# Patient Record
Sex: FEMALE | Race: WHITE | NOT HISPANIC OR LATINO | Employment: FULL TIME | URBAN - METROPOLITAN AREA
[De-identification: names, ages, dates, MRNs, and addresses within clinical notes are randomized per-mention and may not be internally consistent; named-entity substitution may affect disease eponyms.]

---

## 2018-12-28 ENCOUNTER — OFFICE VISIT (OUTPATIENT)
Dept: URGENT CARE | Facility: CLINIC | Age: 53
End: 2018-12-28
Payer: COMMERCIAL

## 2018-12-28 VITALS
RESPIRATION RATE: 16 BRPM | TEMPERATURE: 97.7 F | DIASTOLIC BLOOD PRESSURE: 86 MMHG | HEIGHT: 67 IN | BODY MASS INDEX: 32.33 KG/M2 | SYSTOLIC BLOOD PRESSURE: 142 MMHG | HEART RATE: 72 BPM | WEIGHT: 206 LBS | OXYGEN SATURATION: 99 %

## 2018-12-28 DIAGNOSIS — L72.9 INFECTED CYST OF SKIN: Primary | ICD-10-CM

## 2018-12-28 DIAGNOSIS — L08.9 INFECTED CYST OF SKIN: Primary | ICD-10-CM

## 2018-12-28 PROCEDURE — 99213 OFFICE O/P EST LOW 20 MIN: CPT | Performed by: PHYSICIAN ASSISTANT

## 2018-12-28 RX ORDER — CLINDAMYCIN HYDROCHLORIDE 300 MG/1
300 CAPSULE ORAL 3 TIMES DAILY
Qty: 21 CAPSULE | Refills: 0 | Status: SHIPPED | OUTPATIENT
Start: 2018-12-28 | End: 2019-01-04

## 2018-12-28 RX ORDER — LOSARTAN POTASSIUM 50 MG/1
25 TABLET ORAL DAILY
COMMUNITY

## 2018-12-29 NOTE — PATIENT INSTRUCTIONS
Take clindamycin 3 times daily for 7 days  Take with food and eat yogurt or a probiotic daily to decrease GI upset  Warm compresses frequently  Follow up with dermatology or surgery for removal   Go to the ER for any distress

## 2018-12-31 NOTE — PROGRESS NOTES
[facility]        NAME: Car Romero is a 46 y o  female  : 1965    MRN: [id number]  DATE: 2018  TIME: 8:16 AM    Assessment and Plan   Infected cyst of skin [L72 9, L08 9]  1  Infected cyst of skin  clindamycin (CLEOCIN) 300 MG capsule         Patient Instructions     Take clindamycin 3 times daily for 7 days  Take with food and eat yogurt or a probiotic daily to decrease GI upset  Warm compresses frequently  Follow up with dermatology or surgery for removal   Follow up with PCP in 3-5 days  Proceed to  ER if symptoms worsen  Chief Complaint     Chief Complaint   Patient presents with    Mass     on upper left side of back 20 years, 3 days ago red and swollen         History of Present Illness       This is a 46year old female presenting for cyst x 20 years  She reports that she has had the cyst on her left posterior shoulder for at least 20 years, it has never bothered her before  She was previously advised to follow up with dermatology for removal but did not  Yesterday she noticed that it was slightly painful to touch, which has gradually increased the last day  No fevers, chills, or drainage from the cyst  No treatments for this yet  Review of Systems   Review of Systems   Constitutional: Negative for chills, fatigue and fever  Skin: Positive for color change           Current Medications       Current Outpatient Prescriptions:     losartan (COZAAR) 50 mg tablet, Take 25 mg by mouth daily, Disp: , Rfl:     clindamycin (CLEOCIN) 300 MG capsule, Take 1 capsule (300 mg total) by mouth 3 (three) times a day for 7 days, Disp: 21 capsule, Rfl: 0    Current Allergies     Allergies as of 2018    (No Known Allergies)            The following portions of the patient's history were reviewed and updated as appropriate: allergies, current medications, past family history, past medical history, past social history, past surgical history and problem list      No past medical history on file  No past surgical history on file  No family history on file  Medications have been verified  Objective   /86   Pulse 72   Temp 97 7 °F (36 5 °C)   Resp 16   Ht 5' 7" (1 702 m)   Wt 93 4 kg (206 lb)   SpO2 99%   BMI 32 26 kg/m²        Physical Exam     Physical Exam   Constitutional: She appears well-developed and well-nourished  No distress  HENT:   Nose: Nose normal    Cardiovascular: Normal rate, regular rhythm and normal heart sounds  Pulmonary/Chest: Effort normal and breath sounds normal  No respiratory distress  Neurological: She is alert  Skin: Skin is warm and dry  She is not diaphoretic  There is a 2 cm in diameter area of edema to the posterior left shoulder  There is overlying erythema of the area, some mild fluctuation with underlying induration  The area is tender to the touch  There is no drainage, streaking, or crusting  Nursing note and vitals reviewed

## 2020-10-09 ENCOUNTER — HOSPITAL ENCOUNTER (EMERGENCY)
Facility: HOSPITAL | Age: 55
Discharge: HOME | End: 2020-10-09
Attending: EMERGENCY MEDICINE | Admitting: EMERGENCY MEDICINE
Payer: COMMERCIAL

## 2020-10-09 ENCOUNTER — APPOINTMENT (EMERGENCY)
Dept: RADIOLOGY | Facility: HOSPITAL | Age: 55
End: 2020-10-09
Payer: COMMERCIAL

## 2020-10-09 VITALS
HEART RATE: 75 BPM | RESPIRATION RATE: 18 BRPM | SYSTOLIC BLOOD PRESSURE: 156 MMHG | DIASTOLIC BLOOD PRESSURE: 84 MMHG | BODY MASS INDEX: 32.11 KG/M2 | WEIGHT: 205 LBS | TEMPERATURE: 97.6 F | OXYGEN SATURATION: 100 %

## 2020-10-09 DIAGNOSIS — M54.10 RADICULOPATHY: ICD-10-CM

## 2020-10-09 DIAGNOSIS — R20.2 PARESTHESIAS: Primary | ICD-10-CM

## 2020-10-09 LAB
ALBUMIN SERPL BCP-MCNC: 4.6 G/DL (ref 3.5–5)
ALP SERPL-CCNC: 97 U/L (ref 46–116)
ALT SERPL W P-5'-P-CCNC: 30 U/L (ref 12–78)
ANION GAP SERPL CALCULATED.3IONS-SCNC: 9 MMOL/L (ref 4–13)
AST SERPL W P-5'-P-CCNC: 21 U/L (ref 5–45)
ATRIAL RATE: 68 BPM
ATRIAL RATE: 76 BPM
BASOPHILS # BLD AUTO: 0.08 THOUSANDS/ΜL (ref 0–0.1)
BASOPHILS NFR BLD AUTO: 1 % (ref 0–1)
BILIRUB SERPL-MCNC: 0.4 MG/DL (ref 0.2–1)
BUN SERPL-MCNC: 22 MG/DL (ref 5–25)
CALCIUM SERPL-MCNC: 9.5 MG/DL (ref 8.3–10.1)
CHLORIDE SERPL-SCNC: 103 MMOL/L (ref 100–108)
CO2 SERPL-SCNC: 29 MMOL/L (ref 21–32)
CREAT SERPL-MCNC: 0.87 MG/DL (ref 0.6–1.3)
EOSINOPHIL # BLD AUTO: 0.32 THOUSAND/ΜL (ref 0–0.61)
EOSINOPHIL NFR BLD AUTO: 4 % (ref 0–6)
ERYTHROCYTE [DISTWIDTH] IN BLOOD BY AUTOMATED COUNT: 13 % (ref 11.6–15.1)
GFR SERPL CREATININE-BSD FRML MDRD: 75 ML/MIN/1.73SQ M
GLUCOSE SERPL-MCNC: 121 MG/DL (ref 65–140)
HCT VFR BLD AUTO: 43 % (ref 34.8–46.1)
HGB BLD-MCNC: 14.3 G/DL (ref 11.5–15.4)
IMM GRANULOCYTES # BLD AUTO: 0.03 THOUSAND/UL (ref 0–0.2)
IMM GRANULOCYTES NFR BLD AUTO: 0 % (ref 0–2)
LYMPHOCYTES # BLD AUTO: 1.92 THOUSANDS/ΜL (ref 0.6–4.47)
LYMPHOCYTES NFR BLD AUTO: 24 % (ref 14–44)
MCH RBC QN AUTO: 31.9 PG (ref 26.8–34.3)
MCHC RBC AUTO-ENTMCNC: 33.3 G/DL (ref 31.4–37.4)
MCV RBC AUTO: 96 FL (ref 82–98)
MONOCYTES # BLD AUTO: 0.57 THOUSAND/ΜL (ref 0.17–1.22)
MONOCYTES NFR BLD AUTO: 7 % (ref 4–12)
NEUTROPHILS # BLD AUTO: 5.1 THOUSANDS/ΜL (ref 1.85–7.62)
NEUTS SEG NFR BLD AUTO: 64 % (ref 43–75)
NRBC BLD AUTO-RTO: 0 /100 WBCS
P AXIS: 53 DEGREES
P AXIS: 53 DEGREES
PLATELET # BLD AUTO: 243 THOUSANDS/UL (ref 149–390)
PMV BLD AUTO: 11.1 FL (ref 8.9–12.7)
POTASSIUM SERPL-SCNC: 4.1 MMOL/L (ref 3.5–5.3)
PR INTERVAL: 146 MS
PR INTERVAL: 150 MS
PROT SERPL-MCNC: 8.1 G/DL (ref 6.4–8.2)
QRS AXIS: 24 DEGREES
QRS AXIS: 34 DEGREES
QRSD INTERVAL: 82 MS
QRSD INTERVAL: 84 MS
QT INTERVAL: 376 MS
QT INTERVAL: 410 MS
QTC INTERVAL: 423 MS
QTC INTERVAL: 435 MS
RBC # BLD AUTO: 4.48 MILLION/UL (ref 3.81–5.12)
SODIUM SERPL-SCNC: 141 MMOL/L (ref 136–145)
T WAVE AXIS: 52 DEGREES
T WAVE AXIS: 57 DEGREES
TROPONIN I SERPL-MCNC: <0.02 NG/ML
VENTRICULAR RATE: 68 BPM
VENTRICULAR RATE: 76 BPM
WBC # BLD AUTO: 8.02 THOUSAND/UL (ref 4.31–10.16)

## 2020-10-09 PROCEDURE — 80053 COMPREHEN METABOLIC PANEL: CPT | Performed by: EMERGENCY MEDICINE

## 2020-10-09 PROCEDURE — 85025 COMPLETE CBC W/AUTO DIFF WBC: CPT | Performed by: EMERGENCY MEDICINE

## 2020-10-09 PROCEDURE — 84484 ASSAY OF TROPONIN QUANT: CPT | Performed by: EMERGENCY MEDICINE

## 2020-10-09 PROCEDURE — 71045 X-RAY EXAM CHEST 1 VIEW: CPT

## 2020-10-09 PROCEDURE — 93010 ELECTROCARDIOGRAM REPORT: CPT | Performed by: INTERNAL MEDICINE

## 2020-10-09 PROCEDURE — 99285 EMERGENCY DEPT VISIT HI MDM: CPT | Performed by: EMERGENCY MEDICINE

## 2020-10-09 PROCEDURE — 36415 COLL VENOUS BLD VENIPUNCTURE: CPT | Performed by: EMERGENCY MEDICINE

## 2020-10-09 PROCEDURE — 99284 EMERGENCY DEPT VISIT MOD MDM: CPT

## 2020-10-09 PROCEDURE — 93005 ELECTROCARDIOGRAM TRACING: CPT

## 2020-12-09 ENCOUNTER — NURSE TRIAGE (OUTPATIENT)
Dept: OTHER | Facility: OTHER | Age: 55
End: 2020-12-09

## 2020-12-09 DIAGNOSIS — Z20.822 ENCOUNTER FOR SCREENING LABORATORY TESTING FOR COVID-19 VIRUS IN ASYMPTOMATIC PATIENT: Primary | ICD-10-CM

## 2020-12-10 DIAGNOSIS — Z20.822 ENCOUNTER FOR SCREENING LABORATORY TESTING FOR COVID-19 VIRUS IN ASYMPTOMATIC PATIENT: ICD-10-CM

## 2020-12-10 PROCEDURE — U0003 INFECTIOUS AGENT DETECTION BY NUCLEIC ACID (DNA OR RNA); SEVERE ACUTE RESPIRATORY SYNDROME CORONAVIRUS 2 (SARS-COV-2) (CORONAVIRUS DISEASE [COVID-19]), AMPLIFIED PROBE TECHNIQUE, MAKING USE OF HIGH THROUGHPUT TECHNOLOGIES AS DESCRIBED BY CMS-2020-01-R: HCPCS | Performed by: FAMILY MEDICINE

## 2020-12-11 LAB — SARS-COV-2 RNA SPEC QL NAA+PROBE: NOT DETECTED

## 2021-03-10 DIAGNOSIS — Z23 ENCOUNTER FOR IMMUNIZATION: ICD-10-CM

## 2022-11-14 ENCOUNTER — EVALUATION (OUTPATIENT)
Dept: PHYSICAL THERAPY | Facility: CLINIC | Age: 57
End: 2022-11-14

## 2022-11-14 DIAGNOSIS — M25.552 LEFT HIP PAIN: Primary | ICD-10-CM

## 2022-11-14 NOTE — PROGRESS NOTES
PT Evaluation     Today's date: 2022  Patient name: Cece De  : 1965  MRN: [id number]  Referring provider: Sean Terry MD  Dx: No diagnosis found  Assessment  Assessment details: Cece De is a 61 y o  female presenting to outpatient physical therapy at [place] [address] with complaints of L hip pain   They present with decreased range of motion, decreased strength, limited flexibility, poor postural awareness, poor body mechanics, poor balance, decreased tolerance to activity and decreased functional mobility  Yrn Peers would benefit from skilled physical therapy to address noted impairments in order to allow for full functional return to work and ADL-related activities  Thank you for the referral!  Impairments: abnormal muscle firing, abnormal or restricted ROM, activity intolerance, impaired balance, impaired physical strength, lacks appropriate home exercise program, pain with function and poor body mechanics  Barriers to therapy: n/a  Understanding of Dx/Px/POC: excellent  Goals  ST   Independent with HEP in 2 weeks  2  Decrease pain by 50% in 3 wks    LT  Achieve FOTO score of 67/100 in 6 weeks   2   Able to hold SLS 30 sec bilaterally, no sway in 6 weeks  3  Strength = 5/5 hip ext bilat in 6 weeks  4  Able to hold a side plank 30 sec bilaterally in 6 weeks      Plan  Plan details: RE in 6 weeks    Patient would benefit from: skilled physical therapy  Planned modality interventions: cryotherapy, electrical stimulation/Russian stimulation and thermotherapy: hydrocollator packs  Planned therapy interventions: abdominal trunk stabilization, manual therapy, neuromuscular re-education, therapeutic activities, therapeutic exercise, body mechanics training and home exercise program  Frequency: 2x week  Duration in visits: 12  Duration in weeks: 6  Plan of Care beginning date: 2022  Plan of Care expiration date: 2022  Treatment plan discussed with: patient        Subjective Evaluation    History of Present Illness  Mechanism of injury: Pt c/o L hip pain  She does powerwalking 6 nights per week  She would occasionally wake with L hip pain the following morning  This would resolve with time and movement throughout the day  2 weeks ago, she again woke with the L hip pain, but it progressively got worse to the point of her being unable to walk standing straight up, needing to hunch over  She has tried a cortisone injection without relief, stretching with temporary relief  She has been taking pain medicine daily  She now feels the pain is going into her low back because she is walking differently  Pain is located L greater troch, radiates posterior-inferiorly  Lateral portion of the LLE feels tight  Rated 0/10 at best, 10/10 at worst   Agg with walking 10 mins, with lifting her LLE into her jeep  Denies falls, weakness, recent fever or infection, bowel/bladder changes  Occasional feelings of instability  Positive for lateral calf numbness  Walking classes are between 15-40 mins  She also walks the dog 3 times per day  She works as a  in the [employer]  No pain with working at her desk  She tries to get up and walk frequently  Patient Goals  Patient goals for therapy: decreased edema, decreased pain, improved balance, increased motion, return to work, return to [employer] activities, independence with ADLs/IADLs and increased strength          Objective     Observations     Additional Observation Details  Hips symm standing and supine; NL spinal curves maintained    Tenderness     Additional Tenderness Details  TTP gmax, gmed    (-) direct pref  (-) JAYDA JAMES  (-) hip scour  (-) hip thrust  (-) S/L francesca  (+) elys bilat    SLR 90+ deg bilat; HS length adequate    Lumbar Screen  Lumbar range of motion within normal limits      Neurological Testing     Sensation     Hip   Left Hip   Intact: light touch    Right Hip   Intact: light touch    Reflexes   Left   Clonus sign: negative    Right   Clonus sign: negative    Active Range of Motion   Left Hip   Normal active range of motion    Right Hip   Extension: 0 degrees     Strength/Myotome Testing     Left Hip   Planes of Motion   Flexion: 5  Extension: 4+  Abduction: 5  Adduction: 5  External rotation: 5  Internal rotation: 5    Isolated Muscles   Gluteus mihai: 4+  TFL: 5  Iliopsoas: 5    Right Hip   Planes of Motion   Flexion: 5  Extension: 4+  Abduction: 5  Adduction: 5  External rotation: 5  Internal rotation: 5    Isolated Muscles   Gluteus maximums: 4+  TFL: 5  Iliopsoas: 5    Functional Assessment        Comments  Gait: antalgic  HR: NL  TR: off balance  Squat: fair  SLS: 30 sec bilaterally with incr sway L>R             Precautions: L hip pain, core weakness  Other factors: current smoker  Pt goals: return to power walking classes (30 mins long)      HEP:  Access Code: [email or web address]  URL: [email or web address]  Date: 11/14/2022  Prepared by: Cinthya Harris    Exercises  · Glute squeeze + bridge - 10 reps - 10 sec hold  · Side Plank on Knees - 5 reps - 10 sec hold  · Bird Dog - 5 reps - 10 sec hold            Manuals 11/14            R hip AP mob                                                    Neuro Re-Ed             SLS             tandem             Foam walk f-b, lat             bosu balance             bosu taps             Cone tap                          Ther Ex             bike             Bridge holds 10"x5            Mini SLR             90/90 heel taps             Side plank 10"x5 ea            Bird dog 10"x5 ea            Suitcase carry             Front plank             Prone quad stretch                                                    Ther Activity                                       Gait Training                                       Modalities

## 2022-11-14 NOTE — LETTER
November 15, 2022    [facility]  [address]  [address] [place] [address]    Patient: Hermes Mccormack   YOB: 1965   Date of Visit: 2022     Encounter Diagnosis     ICD-10-CM    1  Left hip pain  M25 552        Dear Dr Chucho Burton: Thank you for your recent referral of Hermes Mccormack  Please review the attached evaluation summary from Melissa's recent visit  Please verify that you agree with the plan of care by signing the attached order  If you have any questions or concerns, please do not hesitate to call  I sincerely appreciate the opportunity to share in the care of one of your patients and hope to have another opportunity to work with you in the near future  Sincerely,    Yossi Perez, PT      Referring Provider:      I certify that I have read the below Plan of Care and certify the need for these services furnished under this plan of treatment while under my care  Pola King MD  Ол[address] [facility] [address]  [address]  [address] [place], [place] [id number]  Via Fax: [phone]          PT Evaluation     Today's date: 2022  Patient name: Hermes Mccormack  : 1965  MRN: [id number]  Referring provider: Marcio Malone MD  Dx: No diagnosis found  Assessment  Assessment details: Hermes Mccormack is a 61 y o  female presenting to outpatient physical therapy at [place] [address] with complaints of L hip pain   They present with decreased range of motion, decreased strength, limited flexibility, poor postural awareness, poor body mechanics, poor balance, decreased tolerance to activity and decreased functional mobility  Talita Figueroa would benefit from skilled physical therapy to address noted impairments in order to allow for full functional return to work and ADL-related activities  Thank you for the referral!  Impairments: abnormal muscle firing, abnormal or restricted ROM, activity intolerance, impaired balance, impaired physical strength, lacks appropriate home exercise program, pain with function and poor body mechanics  Barriers to therapy: n/a  Understanding of Dx/Px/POC: excellent  Goals  ST   Independent with HEP in 2 weeks  2  Decrease pain by 50% in 3 wks    LT  Achieve FOTO score of 67/100 in 6 weeks   2   Able to hold SLS 30 sec bilaterally, no sway in 6 weeks  3  Strength = 5/5 hip ext bilat in 6 weeks  4  Able to hold a side plank 30 sec bilaterally in 6 weeks      Plan  Plan details: RE in 6 weeks  Patient would benefit from: skilled physical therapy  Planned modality interventions: cryotherapy, electrical stimulation/Russian stimulation and thermotherapy: hydrocollator packs  Planned therapy interventions: abdominal trunk stabilization, manual therapy, neuromuscular re-education, therapeutic activities, therapeutic exercise, body mechanics training and home exercise program  Frequency: 2x week  Duration in visits: 12  Duration in weeks: 6  Plan of Care beginning date: 2022  Plan of Care expiration date: 2022  Treatment plan discussed with: patient        Subjective Evaluation    History of Present Illness  Mechanism of injury: Pt c/o L hip pain  She does powerwalking 6 nights per week  She would occasionally wake with L hip pain the following morning  This would resolve with time and movement throughout the day  2 weeks ago, she again woke with the L hip pain, but it progressively got worse to the point of her being unable to walk standing straight up, needing to hunch over  She has tried a cortisone injection without relief, stretching with temporary relief  She has been taking pain medicine daily  She now feels the pain is going into her low back because she is walking differently  Pain is located L greater troch, radiates posterior-inferiorly  Lateral portion of the LLE feels tight  Rated 0/10 at best, 10/10 at worst   Agg with walking 10 mins, with lifting her LLE into her jeep   Denies falls, weakness, recent fever or infection, bowel/bladder changes  Occasional feelings of instability  Positive for lateral calf numbness  Walking classes are between 15-40 mins  She also walks the dog 3 times per day  She works as a  in the [employer]  No pain with working at her desk  She tries to get up and walk frequently  Patient Goals  Patient goals for therapy: decreased edema, decreased pain, improved balance, increased motion, return to work, return to [employer] activities, independence with ADLs/IADLs and increased strength          Objective     Observations     Additional Observation Details  Hips symm standing and supine; NL spinal curves maintained    Tenderness     Additional Tenderness Details  TTP gmax, gmed    (-) direct pref  (-) JACOBMARKIR  (-) hip scour  (-) hip thrust  (-) S/L francesca  (+) elys bilat    SLR 90+ deg bilat; HS length adequate    Lumbar Screen  Lumbar range of motion within normal limits      Neurological Testing     Sensation     Hip   Left Hip   Intact: light touch    Right Hip   Intact: light touch    Reflexes   Left   Clonus sign: negative    Right   Clonus sign: negative    Active Range of Motion   Left Hip   Normal active range of motion    Right Hip   Extension: 0 degrees     Strength/Myotome Testing     Left Hip   Planes of Motion   Flexion: 5  Extension: 4+  Abduction: 5  Adduction: 5  External rotation: 5  Internal rotation: 5    Isolated Muscles   Gluteus mihai: 4+  TFL: 5  Iliopsoas: 5    Right Hip   Planes of Motion   Flexion: 5  Extension: 4+  Abduction: 5  Adduction: 5  External rotation: 5  Internal rotation: 5    Isolated Muscles   Gluteus maximums: 4+  TFL: 5  Iliopsoas: 5    Functional Assessment        Comments  Gait: antalgic  HR: NL  TR: off balance  Squat: fair  SLS: 30 sec bilaterally with incr sway L>R             Precautions: L hip pain, core weakness  Other factors: current smoker  Pt goals: return to power walking classes (30 mins long)      HEP:  Access Code: [email or web address]  URL: [email or web address]  Date: 11/14/2022  Prepared by: Lisbet Omalley    Exercises  · Glute squeeze + bridge - 10 reps - 10 sec hold  · Side Plank on Knees - 5 reps - 10 sec hold  · Bird Dog - 5 reps - 10 sec hold            Manuals 11/14            R hip AP mob                                                    Neuro Re-Ed             SLS             tandem             Foam walk f-b, lat             bosu balance             bosu taps             Cone tap                          Ther Ex             bike             Bridge holds 10"x5            Mini SLR             90/90 heel taps             Side plank 10"x5 ea            Bird dog 10"x5 ea            Suitcase carry             Front plank             Prone quad stretch                                                    Ther Activity                                       Gait Training                                       Modalities

## 2022-11-17 ENCOUNTER — OFFICE VISIT (OUTPATIENT)
Dept: PHYSICAL THERAPY | Facility: CLINIC | Age: 57
End: 2022-11-17

## 2022-11-17 DIAGNOSIS — M25.552 LEFT HIP PAIN: Primary | ICD-10-CM

## 2022-11-17 NOTE — PROGRESS NOTES
Daily Note     Today's date: 2022  Patient name: Lonita Boas  : 1965  MRN: [id number]  Referring provider: Lela Trejo MD  Dx:   Encounter Diagnosis     ICD-10-CM    1  Left hip pain  M25 552                      Subjective: Pt continues to be in a lot of pain  Objective: See treatment diary below      Assessment: Initiated PT POC today  Pt presented with significant gait disturbance, favoring her L hip and low back  Noted LLD and SI MET was performed which corrected LLD  STM to low back, QL and hip in collaboration with SI correction her gait improved significantly and pain was minimized  Tolerated treatment well  Patient demonstrated fatigue post treatment, exhibited good technique with therapeutic exercises and would benefit from continued PT      Plan: Continue per plan of care  Progress treatment as tolerated  Precautions: L hip pain, core weakness  Other factors: current smoker  Pt goals: return to power walking classes (30 mins long)      HEP:  Access Code: [email or web address]  URL: [email or web address]  Date: 2022  Prepared by: Tyrone Standard    Exercises  · Glute squeeze + bridge - 10 reps - 10 sec hold  · Side Plank on Knees - 5 reps - 10 sec hold  · Bird Dog - 5 reps - 10 sec hold            Manuals            L hip AP mob             STM L low back, QL, hip  WE           SI MET for LLD  WE corrected                        Neuro Re-Ed             SLS  30"x2           tandem  30"x2 ea           Foam walk f-b, lat             bosu balance             bosu taps             Cone tap                          Ther Ex             bike  5 min           Bridge holds 10"x5 10"x10           Mini SLR  x10           90/90 heel taps             Side plank 10"x5 ea            Bird dog 10"x5 ea            Suitcase carry             Front plank             Prone quad stretch             DLS Abdominal bracing  5"x10           DLS marching  Q63 Ther Activity                                       Gait Training                                       Modalities

## 2022-11-22 ENCOUNTER — OFFICE VISIT (OUTPATIENT)
Dept: PHYSICAL THERAPY | Facility: CLINIC | Age: 57
End: 2022-11-22

## 2022-11-22 DIAGNOSIS — M25.552 LEFT HIP PAIN: Primary | ICD-10-CM

## 2022-11-22 NOTE — PROGRESS NOTES
Daily Note     Today's date: 2022  Patient name: Moon Warner  : 1965  MRN: [id number]  Referring provider: Joanna Jackson MD  Dx:   Encounter Diagnosis     ICD-10-CM    1  Left hip pain  M25 552                      Subjective: Pt reports she felt great after leaving  and carried over into the next days, but by the end of the next day she was hurting again      Objective: See treatment diary below      Assessment: Pt presents again with a slumped, side bent type of position to the R to offload L hip  Pt had no LLD today and SI correction was not needed  She continues to have tenderness in L glute, lateral hip and QL  Eased with manual stretching and STM/TrP release  Post manual therapy she walked with a perfectly upright posture with no pain  She was able to perform increased TE's with excellent tolerance  Tolerated treatment well  Patient demonstrated fatigue post treatment, exhibited good technique with therapeutic exercises and would benefit from continued PT      Plan: Continue per plan of care  Progress treatment as tolerated  Precautions: L hip pain, core weakness  Other factors: current smoker  Pt goals: return to power walking classes (30 mins long)      HEP:  Access Code: [email or web address]  URL: [email or web address]  Date: 2022  Prepared by: Gabrielle Raymond    Exercises  · Glute squeeze + bridge - 10 reps - 10 sec hold  · Side Plank on Knees - 5 reps - 10 sec hold  · Bird Dog - 5 reps - 10 sec hold            Manuals           L hip AP mob             STM L low back, QL, hip  WE WE          SI MET for LLD  WE corrected npt                       Neuro Re-Ed             SLS  30"x2           tandem  30"x2 ea           Foam walk f-b, lat             bosu balance             bosu taps             Cone tap                          Ther Ex             bike  5 min 6 min          Bridge holds 10"x5 10"x10 10"x10          Seated HS stretch   20"x3 ea Seated Lumbar Flx stretch   20"x3 ea          Seated QL stretch   20"x3 ea          Mini SLR  x10 x10          90/90 heel taps             Side plank 10"x5 ea  nv          Bird dog 10"x5 ea            Suitcase carry   nv          Front plank             Prone quad stretch             DLS Abdominal bracing  5"x10           DLS marching  G67 R44 ea          Lateral step ups   8"x20 ea          TB side stepping    BTB  x2 laps          TB monster walks   BTB  x2 laps          Ther Activity                                       Gait Training                                       Modalities

## 2022-11-25 ENCOUNTER — OFFICE VISIT (OUTPATIENT)
Dept: PHYSICAL THERAPY | Facility: CLINIC | Age: 57
End: 2022-11-25

## 2022-11-25 DIAGNOSIS — M25.552 LEFT HIP PAIN: Primary | ICD-10-CM

## 2022-11-25 NOTE — PROGRESS NOTES
Daily Note     Today's date: 2022  Patient name: Supa Reddy  : 1965  MRN: [id number]  Referring provider: Hanny Davidson MD  Dx:   Encounter Diagnosis     ICD-10-CM    1  Left hip pain  M25 552           Start Time: 730          Subjective: Walking better but low back is bothering her after being on her feet for a while yesterday  Objective: See treatment diary below      Assessment: Tolerated independent stretching and TE fine  No change in low back stiffness with introduced low back stretching  Tolerated treatment well  Patient demonstrated fatigue post treatment, exhibited good technique with therapeutic exercises and would benefit from continued PT      Plan: Continue per plan of care  Progress treatment as tolerated  Precautions: L hip pain, core weakness  Other factors: current smoker  Pt goals: return to power walking classes (30 mins long)      HEP:  Access Code: [email or web address]  URL: [email or web address]  Date: 2022  Prepared by: Shari Vuong    Exercises  · Glute squeeze + bridge - 10 reps - 10 sec hold  · Side Plank on Knees - 5 reps - 10 sec hold  · Bird Dog - 5 reps - 10 sec hold            Manuals          L hip AP mob             STM L low back, QL, hip  WE WE          SI MET for LLD  WE corrected npt                       Neuro Re-Ed             SLS  30"x2           tandem  30"x2 ea           Foam walk f-b, lat             bosu balance             bosu taps             Cone tap                          Ther Ex             bike  5 min 6 min 7'         TB roll out    x20 ea         Bridge holds 10"x5 10"x10 10"x10 10"x10         Seated HS stretch   20"x3 ea 20"x3 ea         Seated Lumbar Flx stretch   20"x3 ea          Seated QL stretch   20"x3 ea 20"x3 ea         Mini SLR  x10 x10 5"X10         90/90 heel taps             Side plank 10"x5 ea  nv          Bird dog 10"x5 ea            Suitcase carry   [employer] Prone quad stretch             DLS Abdominal bracing  5"x10           DLS marching  V16 W69 ea          Lateral step ups   8"x20 ea 8in x20 ea         TB side stepping    BTB  x2 laps btb 3x10         TB monster walks   BTB  x2 laps btb 3x10         Ther Activity                                       Gait Training                                       Modalities

## 2022-11-28 ENCOUNTER — OFFICE VISIT (OUTPATIENT)
Dept: PHYSICAL THERAPY | Facility: CLINIC | Age: 57
End: 2022-11-28

## 2022-11-28 DIAGNOSIS — M25.552 LEFT HIP PAIN: Primary | ICD-10-CM

## 2022-11-28 NOTE — PROGRESS NOTES
Daily Note     Today's date: 2022  Patient name: Princess Pratt  : 1965  MRN: [id number]  Referring provider: Skyla Kiser MD  Dx:   Encounter Diagnosis     ICD-10-CM    1  Left hip pain  M25 552                      Subjective: Pt reports she has been feeling good overall  She always leaves therapy feeling good for a least a day or two  Between last visit and this visit she went to [employer] and stood for a long time which flared up her hip but she felt good again the next morning  Objective: See treatment diary below      Assessment: Tolerated treatment well  Again showed a LLD which was corrected with SI MET and showed immediate improvement in her gait  Continued LE strengthening with new challenges and added some agility exercises to lead pt into returning to power walking which was her main goal  Not nearly as much tenderness noted through the L glute, QL and low back today  Patient demonstrated fatigue post treatment, exhibited good technique with therapeutic exercises and would benefit from continued PT      Plan: Continue per plan of care  Progress treatment as tolerated  Begin to add more challenging core work  Precautions: L hip pain, core weakness  Other factors: current smoker  Pt goals: return to power walking classes (30 mins long)      HEP:  Access Code: [email or web address]  URL: [email or web address]  Date: 2022  Prepared by: Luis Slack    Exercises  · Glute squeeze + bridge - 10 reps - 10 sec hold  · Side Plank on Knees - 5 reps - 10 sec hold  · Bird Dog - 5 reps - 10 sec hold            Manuals         L hip AP mob             STM L low back, QL, hip  WE WE  WE        SI MET for LLD  WE corrected np  WE corrected                     Neuro Re-Ed             SLS  30"x2           tandem  30"x2 ea           Foam walk f-b, lat             bosu balance             bosu taps             Cone tap Ther Ex             bike  5 min 6 min 7'         Elliptical      5 min        TB roll out    x20 ea         Bridge holds 10"x5 10"x10 10"x10 10"x10 10"x10        Seated HS stretch   20"x3 ea 20"x3 ea 20"x3 ea        Seated Lumbar Flx stretch   20"x3 ea          Seated QL stretch   20"x3 ea 20"x3 ea         Mini SLR  x10 x10 5"X10 5"x10 ea        S/L hip ABD    x20 x20        Mu stretch into 90/90      x20 ea        LTR     x20 ea        90/90 heel taps     x20 ea        Side plank 10"x5 ea  nv          Bird dog 10"x5 ea            Suitcase carry   Praxair plank             Prone quad stretch             DLS Abdominal bracing  5"x10           DLS marching  W10 T20 ea          Lateral step ups   8"x20 ea 8in x20 ea         TB side stepping    BTB  x2 laps btb 3x10         TB monster walks   BTB  x2 laps btb 3x10         [place] Carry     15# KB  1 lap ea        Slant squat with KB     10# KB  x10                     Ther Activity             Agility Ladder     5 min        TM power walk     5 min  3 5 mph        Gait Training                                       Modalities

## 2022-12-01 ENCOUNTER — OFFICE VISIT (OUTPATIENT)
Dept: PHYSICAL THERAPY | Facility: CLINIC | Age: 57
End: 2022-12-01

## 2022-12-01 DIAGNOSIS — M25.552 LEFT HIP PAIN: Primary | ICD-10-CM

## 2022-12-01 NOTE — PROGRESS NOTES
Daily Note     Today's date: 2022  Patient name: France Lundberg  : 1965  MRN: [id number]  Referring provider: Maida Gannon MD  Dx:   Encounter Diagnosis     ICD-10-CM    1  Left hip pain  M25 552                      Subjective: Pt reports her hips have been feeling great, which was her chief complaint but low back has been a nagging achy pain still  Objective: See treatment diary below      Assessment: Tolerated treatment well  No LLD today and hip pain has been under control but more low back pain  Post manual therapy low back was feeling better  Worked on more hip and core stability and strengthening TE's and plan to continue incorporating more difficult core work  Patient demonstrated fatigue post treatment, exhibited good technique with therapeutic exercises and would benefit from continued PT      Plan: Continue per plan of care  Progress treatment as tolerated  Begin to add more challenging core work  Precautions: L hip pain, core weakness  Other factors: current smoker  Pt goals: return to power walking classes (30 mins long)      HEP:  Access Code: [email or web address]  URL: [email or web address]  Date: 2022  Prepared by: Kirby Nelson    Exercises  · Glute squeeze + bridge - 10 reps - 10 sec hold  · Side Plank on Knees - 5 reps - 10 sec hold  · Bird Dog - 5 reps - 10 sec hold            Manuals        L hip AP mob             STM L low back, QL, hip  WE WE  WE WE       SI MET for LLD  WE corrected np  WE corrected Not needed                    Neuro Re-Ed             SLS  30"x2           tandem  30"x2 ea           Foam walk f-b, lat             bosu balance             bosu taps             Cone tap             Star Balance w  slider      3x5       Ther Ex             bike  5 min 6 min 7'  7 min       Elliptical      5 min        TB roll out    x20 ea         Bridge holds 10"x5 10"x10 10"x10 10"x10 10"x10        Seated HS stretch   20"x3 ea 20"x3 ea 20"x3 ea        Seated Lumbar Flx stretch   20"x3 ea          Seated QL stretch   20"x3 ea 20"x3 ea         Mini SLR  x10 x10 5"X10 5"x10 ea 4 way  5"x10 ea       S/L hip ABD    x20 x20        Mu stretch into 90/90      x20 ea x20       LTR     x20 ea        90/90 heel taps     x20 ea        Side plank 10"x5 ea  nv          Bird dog 10"x5 ea            Suitcase carry   nv   30# 1 laps ea       Front plank             Prone quad stretch             DLS Abdominal bracing  5"x10           DLS marching  S99 G74 ea          Lateral step ups   8"x20 ea 8in x20 ea         TB side stepping    BTB  x2 laps btb 3x10         TB monster walks   BTB  x2 laps btb 3x10         [place] Carry     15# KB  1 lap ea 15# KB  1 lap ea       Slant squat with KB     10# KB  x10        Good Mornings      15# KB  x10       Fwd Walking Lunges      10x2       Lat Lunges      10x2                                 Ther Activity             Agility Ladder     5 min        TM power walk     5 min  3 5 mph        Gait Training                                       Modalities

## 2022-12-05 ENCOUNTER — APPOINTMENT (OUTPATIENT)
Dept: PHYSICAL THERAPY | Facility: CLINIC | Age: 57
End: 2022-12-05

## 2022-12-06 ENCOUNTER — OFFICE VISIT (OUTPATIENT)
Dept: PHYSICAL THERAPY | Facility: CLINIC | Age: 57
End: 2022-12-06

## 2022-12-06 DIAGNOSIS — M25.552 LEFT HIP PAIN: Primary | ICD-10-CM

## 2022-12-06 NOTE — PROGRESS NOTES
Daily Note     Today's date: 2022  Patient name: Marcie Rutledge  : 1965  MRN: [id number]  Referring provider: Win Jiang MD  Dx:   Encounter Diagnosis     ICD-10-CM    1  Left hip pain  M25 552                      Subjective: Pt reports she has been feeling pretty good except for some mild low back pain  Objective: See treatment diary below      Assessment: Tolerated treatment well  Pt continues to progress excellently  FOTO score improved significantly  Signed up for classes to return to power walking which was her main goal  Added more strengthening and core work today with good tolerance  Patient demonstrated fatigue post treatment, exhibited good technique with therapeutic exercises and would benefit from continued PT      Plan: Continue per plan of care  Progress treatment as tolerated  Precautions: L hip pain, core weakness  Other factors: current smoker  Pt goals: return to power walking classes (30 mins long)      HEP:  Access Code: [email or web address]  URL: [email or web address]  Date: 2022  Prepared by: Iam Piña    Exercises  · Glute squeeze + bridge - 10 reps - 10 sec hold  · Side Plank on Knees - 5 reps - 10 sec hold  · Bird Dog - 5 reps - 10 sec hold            Manuals  12      L hip AP mob             STM L low back, QL, hip  WE WE  WE WE WE      SI MET for LLD  WE corrected np  WE corrected Not needed Not needed                   Neuro Re-Ed             SLS  30"x2           tandem  30"x2 ea           Foam walk f-b, lat             bosu balance             bosu taps             Cone tap             Star Balance w  slider      3x5 5x3      Ther Ex             bike  5 min 6 min 7'  7 min       Elliptical      5 min  6 min      TB roll out    x20 ea         Bridge holds 10"x5 10"x10 10"x10 10"x10 10"x10        Seated HS stretch   20"x3 ea 20"x3 ea 20"x3 ea        Seated Lumbar Flx stretch   20"x3 ea          Seated QL stretch   20"x3 ea 20"x3 ea         Mini SLR  x10 x10 5"X10 5"x10 ea 4 way  5"x10 ea 4 way  x20 ea      Bridge with pball HS curls       x10      BoSu Squats       x20      CC walk outs       65# x5 ea      Planks       30"x3      Side planks       20"x3 ea      S/L hip ABD    x20 x20        Mu stretch into 90/90      x20 ea x20       Mu stretch       30"x3      LTR     x20 ea        90/90 heel taps     x20 ea        Side plank 10"x5 ea  nv          Bird dog 10"x5 ea            Suitcase carry   nv   30# 1 laps ea       Prone quad stretch             DLS Abdominal bracing  5"x10           DLS marching  F85 A57 ea          Lateral step ups   8"x20 ea 8in x20 ea         TB side stepping    BTB  x2 laps btb 3x10         TB monster walks   BTB  x2 laps btb 3x10         [place] Carry     15# KB  1 lap ea 15# KB  1 lap ea       Slant squat with KB     10# KB  x10        Good Mornings      15# KB  x10       Fwd Walking Lunges      10x2 10x2      Lat Lunges      10x2 10x2      Sled push       200#  2 laps                   Ther Activity             Agility Ladder     5 min        TM power walk     5 min  3 5 mph  nv      Gait Training                                       Modalities

## 2022-12-12 ENCOUNTER — OFFICE VISIT (OUTPATIENT)
Dept: PHYSICAL THERAPY | Facility: CLINIC | Age: 57
End: 2022-12-12

## 2022-12-12 DIAGNOSIS — M25.552 LEFT HIP PAIN: Primary | ICD-10-CM

## 2022-12-12 NOTE — PROGRESS NOTES
Daily Note / Discharge    Today's date: 2022  Patient name: Basia Ram  : 1965  MRN: [id number]  Referring provider: Gerardo Johansen MD  Dx:   Encounter Diagnosis     ICD-10-CM    1  Left hip pain  M25 552                      Subjective: Pt reports she has been feeling great and feels comfortable transitioning to independent HEP     Objective: See treatment diary below      Assessment: Pt  has continued to progress and is ready to be d/c to Home Exercise Program today  Pt  now reports no pain at rest or with activity in the hip at this time  Pt  now improved to having no difficulty with ADL's due to condition being seen at PT for  ROM has improved to WNL   Discussed TE's given on Home Exercise Program, which pt  showed competency in   Pt  Has met all impairment and functional goals and has returned to power walking classes which was her main functional goal     Plan: Continue per plan of care  Progress treatment as tolerated  Precautions: L hip pain, core weakness  Other factors: current smoker  Pt goals: return to power walking classes (30 mins long)      HEP:  Access Code: [email or web address]  URL: [email or web address]  Date: 2022  Prepared by: Em Castorena    Exercises  · Glute squeeze + bridge - 10 reps - 10 sec hold  · Side Plank on Knees - 5 reps - 10 sec hold  · Bird Dog - 5 reps - 10 sec hold            Manuals      L hip AP mob             STM L low back, QL, hip  WE WE  WE WE WE      SI MET for LLD  WE corrected np  WE corrected Not needed Not needed                   Neuro Re-Ed             SLS  30"x2           tandem  30"x2 ea           Foam walk f-b, lat             bosu balance             bosu taps             Cone tap             Star Balance w  slider      3x5 5x3      Ther Ex             bike  5 min 6 min 7'  7 min  6 min     Elliptical      5 min  6 min      TB roll out    x20 W  [employer] holds 10"x5 10"x10 10"x10 10"x10 10"x10        Seated HS stretch   20"x3 ea 20"x3 ea 20"x3 ea        Seated Lumbar Flx stretch   20"x3 ea          Seated QL stretch   20"x3 ea 20"x3 ea         Mini SLR  x10 x10 5"X10 5"x10 ea 4 way  5"x10 ea 4 way  x20 ea 4 way  x20 ea     Bridge with pball HS curls       x10 10     BoSu Squats       x20 20  15# KB     CC walk outs       65# x5 ea 65# x5 ea     Planks       30"x3      Side planks       20"x3 ea      S/L hip ABD    x20 x20        Mu stretch into 90/90      x20 ea x20       Mu stretch       30"x3      LTR     x20 ea        90/90 heel taps     x20 ea        Side plank 10"x5 ea  nv          Bird dog 10"x5 ea            Suitcase carry   nv   30# 1 laps ea       Prone quad stretch             DLS Abdominal bracing  5"x10           DLS marching  O78 P84 ea          Lateral step ups   8"x20 ea 8in x20 ea         TB side stepping    BTB  x2 laps btb 3x10         TB monster walks   BTB  x2 laps btb 3x10         [place] Carry     15# KB  1 lap ea 15# KB  1 lap ea       Slant squat with KB     10# KB  x10        Good Mornings      15# KB  x10       Fwd Walking Lunges      10x2 10x2 10x2     Lat Lunges      10x2 10x2 10x2     Sled push       200#  2 laps                   Ther Activity             Agility Ladder     5 min   5 min     TM power walk     5 min  3 5 mph  nv      Gait Training                                       Modalities

## 2022-12-19 ENCOUNTER — APPOINTMENT (OUTPATIENT)
Dept: PHYSICAL THERAPY | Facility: CLINIC | Age: 57
End: 2022-12-19

## 2022-12-27 ENCOUNTER — APPOINTMENT (OUTPATIENT)
Dept: PHYSICAL THERAPY | Facility: CLINIC | Age: 57
End: 2022-12-27

## 2024-03-11 ENCOUNTER — TELEPHONE (OUTPATIENT)
Age: 59
End: 2024-03-11

## 2024-03-11 ENCOUNTER — PREP FOR PROCEDURE (OUTPATIENT)
Age: 59
End: 2024-03-11

## 2024-03-11 DIAGNOSIS — Z86.010 HISTORY OF COLON POLYPS: Primary | ICD-10-CM

## 2024-03-11 NOTE — TELEPHONE ENCOUNTER
Scheduled date of colonoscopy (as of today):4/19/24    Physician performing colonoscopy:Dr Stoner    Location of colonoscopy:[facility]    Bowel prep reviewed with patient:miralax/dulcolax    Instructions reviewed with patient by:prep instructions sent via [employer]    Clearances: N/A

## 2024-03-11 NOTE — TELEPHONE ENCOUNTER
24  Screened by: Cherie Serna    Referring Provider Dr Stoner    Pre- Screenin'7 190 BMI 29.8    Has patient been referred for a routine screening Colonoscopy? yes  Is the patient between 45-75 years old? yes      Previous Colonoscopy yes   If yes:    Date: 2019    Facility:     Reason:         Does the patient want to see a Gastroenterologist prior to their procedure OR are they having any GI symptoms? no    Has the patient been hospitalized or had abdominal surgery in the past 6 months? no    Does the patient use supplemental oxygen? no    Does the patient take Coumadin, Lovenox, Plavix, Elliquis, Xarelto, or other blood thinning medication? no    Has the patient had a stroke, cardiac event, or stent placed in the past year? no      If patient is between 45yrs - 49yrs, please advise patient that we will have to confirm benefits & coverage with their insurance company for a routine screening colonoscopy.

## 2024-04-19 ENCOUNTER — HOSPITAL ENCOUNTER (OUTPATIENT)
Dept: GASTROENTEROLOGY | Facility: AMBULATORY SURGERY CENTER | Age: 59
Discharge: HOME | End: 2024-04-19

## 2024-04-19 VITALS
BODY MASS INDEX: 29.82 KG/M2 | RESPIRATION RATE: 27 BRPM | HEART RATE: 63 BPM | DIASTOLIC BLOOD PRESSURE: 78 MMHG | OXYGEN SATURATION: 99 % | SYSTOLIC BLOOD PRESSURE: 149 MMHG | TEMPERATURE: 97.2 F | WEIGHT: 190 LBS | HEIGHT: 67 IN

## 2024-04-19 DIAGNOSIS — Z86.010 HISTORY OF COLON POLYPS: ICD-10-CM

## 2024-04-19 RX ORDER — TELMISARTAN AND HYDROCHLORTHIAZIDE 80; 12.5 MG/1; MG/1
1 TABLET ORAL DAILY
COMMUNITY
Start: 2024-03-18

## 2024-04-19 RX ORDER — SODIUM CHLORIDE, SODIUM LACTATE, POTASSIUM CHLORIDE, CALCIUM CHLORIDE 600; 310; 30; 20 MG/100ML; MG/100ML; MG/100ML; MG/100ML
50 INJECTION, SOLUTION INTRAVENOUS CONTINUOUS
Status: DISCONTINUED | OUTPATIENT
Start: 2024-04-19 | End: 2024-04-23 | Stop reason: HOSPADM

## 2024-04-19 RX ORDER — SEMAGLUTIDE 0.5 MG/.5ML
INJECTION, SOLUTION SUBCUTANEOUS
COMMUNITY
Start: 2023-10-27

## 2024-07-09 NOTE — PROGRESS NOTES
Ambulatory Visit  Name: Melissa Smith      : 1965      MRN: [id number]  Encounter Provider: Jeannette Flanagan MD  Encounter Date: 7/10/2024   Encounter department: [facility] [place]    Assessment & Plan   {There are no diagnoses linked to this encounter. (Refresh or delete this SmartLink)}    History of Present Illness   {Disappearing Hyperlinks I Encounters * My Last Note * Since Last Visit * History :60060}  Melissa Smith is a 59 y.o. female who presents for symptoms related to prior fistula repair site.      The patient has a surgical history of fistula repair.    Previous colonoscopy performed on 3/29/2019 by Dr Manan Stoner, with a 3 year recall.     Review of Systems  {Select to Display PM (Optional):74845}  Objective   {Disappearing Hyperlinks   Review Vitals * Enter New Vitals * Results Review * Labs * Imaging * Cardiology * Procedures * Lung Cancer Screening :51682}  There were no vitals taken for this visit.    Physical Exam  Administrative Statements {Disappearing Hyperlinks I  Level of Service * PCMH/PCSP:09954}  {Time Spent Statement (Optional):90134}

## 2024-07-09 NOTE — PROGRESS NOTES
Ambulatory Visit  Name: Melissa Smith      : 1965      MRN: [id number]  Encounter Provider: Jeannette Flanagan MD  Encounter Date: 7/10/2024   Encounter department: [facility] [place]    Assessment & Plan   1. Perianal fistula  Patient has a history of perianal fistula status post repair at age 27 and again at age 39  I have asked her to retrieve records and operative reports to further aid in guidance  We discussed how primary fistulotomy is typically successful in preventing recurrence; however, she likely had a sphincter preserving procedure, as she has no external scar present  We discussed symptom management with potential incision and drainage if perianal swelling and pain occur  We also discussed potential MRI to further assess anatomy if symptoms are nagging and persistent  In the meantime, she will proceed with colonoscopy for screening purposes  Follow-up in office as needed    History of Present Illness       Melissa Smith is a 59 y.o. female who presents for symptoms related to prior fistula repair site.       The patient has a surgical history of fistula repair when she was 27 years old.    She reports that she began to feel pressure and a lump around the site of the previous fistula repair. This began last month. She states that the pressure has decreased and the lump has reduced in size.     Previous colonoscopy performed on 3/29/2019 by Dr Manan Stoner, with a 3 year recall. She had a colonoscopy attempt in April of this year but she was unable to have the procedure since she did not stop her Wegovy.     She has daily bowel movements. The consistency of the stool varies.     She reports rectal bleeding. This is bright red blood upon wiping. She has not had any bleeding since April.     Review of Systems   Constitutional:  Negative for chills and fever.   HENT:  Negative for ear pain and sore throat.    Eyes:  Negative for pain and visual disturbance.    Respiratory:  Negative for cough and shortness of breath.    Cardiovascular:  Negative for chest pain and palpitations.   Gastrointestinal:  Positive for rectal pain. Negative for abdominal pain and vomiting.   Genitourinary:  Negative for dysuria and hematuria.   Musculoskeletal:  Negative for arthralgias and back pain.   Skin:  Negative for color change and rash.   Neurological:  Negative for seizures and syncope.   All other systems reviewed and are negative.    Past Medical History   Past Medical History:   Diagnosis Date    Hypertension      Past Surgical History:   Procedure Laterality Date    CERVICAL CONE BIOPSY       SECTION      COLONOSCOPY      FISTULA REPAIR       History reviewed. No pertinent family history.  Current Outpatient Medications on File Prior to Visit   Medication Sig Dispense Refill    rosuvastatin (CRESTOR) 5 mg tablet Take 5 mg by mouth daily      telmisartan-hydrochlorothiazide (MICARDIS HCT) 80-12.5 MG per tablet Take 1 tablet by mouth daily      Wegovy 0.5 MG/0.5ML 0.5 mL Subcutaneous once weekly for 30 days      losartan (COZAAR) 50 mg tablet Take 25 mg by mouth daily (Patient not taking: Reported on 7/10/2024)       No current facility-administered medications on file prior to visit.   No Known Allergies   Objective     There were no vitals taken for this visit.    Physical Exam  Vitals and nursing note reviewed.   Constitutional:       General: She is not in acute distress.     Appearance: She is well-developed.   HENT:      Head: Normocephalic and atraumatic.   Eyes:      Conjunctiva/sclera: Conjunctivae normal.   Cardiovascular:      Rate and Rhythm: Normal rate and regular rhythm.      Heart sounds: No murmur heard.  Pulmonary:      Effort: Pulmonary effort is normal. No respiratory distress.      Breath sounds: Normal breath sounds.   Abdominal:      Palpations: Abdomen is soft.      Tenderness: There is no abdominal tenderness.   Genitourinary:     Comments: Mild left  anterior perianal swelling without active drainage or fluctuance  No visible external radial scar  Strong tone on digital anorectal exam  Anoscopy showing small internal hemorrhoids without internal opening  Musculoskeletal:         General: No swelling.      Cervical back: Neck supple.   Skin:     General: Skin is warm and dry.      Capillary Refill: Capillary refill takes less than 2 seconds.   Neurological:      Mental Status: She is alert.   Psychiatric:         Mood and Affect: Mood normal.     Lower Endoscopy    Date/Time: 7/10/2024 2:00 PM    Performed by: Jeannette Flanagan MD  Authorized by: Jeannette Flanagan MD    Verbal consent obtained?: Yes    Risks and benefits: Risks, benefits and alternatives were discussed    Consent given by:  Patient  Patient identity confirmed:  Verbally with patient and provided demographic data  Time out: Immediately prior to the procedure a time out was called    Patient sedated: No    Scope type:  Anoscope  External exam performed: Yes    Digital exam performed: Yes    Internal hemorrhoids: Yes        Administrative Statements   I have spent a total time of 33 minutes in caring for this patient on the day of the visit/encounter including Diagnostic results, Prognosis, Risks and benefits of tx options, Instructions for management, Patient and family education, Importance of tx compliance, Risk factor reductions, Impressions, Counseling / Coordination of care, Documenting in the medical record, Reviewing / ordering tests, medicine, procedures  , Obtaining or reviewing history  , and Communicating with other healthcare professionals .

## 2024-07-10 ENCOUNTER — OFFICE VISIT (OUTPATIENT)
Age: 59
End: 2024-07-10
Payer: COMMERCIAL

## 2024-07-10 VITALS
WEIGHT: 179 LBS | BODY MASS INDEX: 28.09 KG/M2 | SYSTOLIC BLOOD PRESSURE: 160 MMHG | DIASTOLIC BLOOD PRESSURE: 88 MMHG | HEIGHT: 67 IN

## 2024-07-10 DIAGNOSIS — K60.3 PERIANAL FISTULA: Primary | ICD-10-CM

## 2024-07-10 PROCEDURE — 46600 DIAGNOSTIC ANOSCOPY SPX: CPT | Performed by: SURGERY

## 2024-07-10 PROCEDURE — 99203 OFFICE O/P NEW LOW 30 MIN: CPT | Performed by: SURGERY

## 2024-07-10 RX ORDER — ROSUVASTATIN CALCIUM 5 MG/1
5 TABLET, COATED ORAL DAILY
COMMUNITY
Start: 2024-05-28

## 2024-12-11 ENCOUNTER — PREP FOR PROCEDURE (OUTPATIENT)
Dept: GASTROENTEROLOGY | Facility: CLINIC | Age: 59
End: 2024-12-11

## 2024-12-11 ENCOUNTER — TELEPHONE (OUTPATIENT)
Dept: GASTROENTEROLOGY | Facility: CLINIC | Age: 59
End: 2024-12-11

## 2024-12-11 DIAGNOSIS — Z86.0100 HISTORY OF COLON POLYPS: Primary | ICD-10-CM

## 2024-12-11 RX ORDER — SODIUM PICOSULFATE, MAGNESIUM OXIDE, AND ANHYDROUS CITRIC ACID 12; 3.5; 1 G/175ML; G/175ML; MG/175ML
LIQUID ORAL
Qty: 350 ML | Refills: 0 | Status: SHIPPED | OUTPATIENT
Start: 2024-12-11

## 2024-12-11 NOTE — TELEPHONE ENCOUNTER
12/11/24  Screened by: Kamla Schaeffer    Referring Provider Dr. Stoner    Pre- Screening:     There is no height or weight on file to calculate BMI.  Has patient been referred for a routine screening Colonoscopy? yes  Is the patient between 45-75 years old? yes      Previous Colonoscopy yes   If yes:    Date:     Facility:     Reason:       SCHEDULING STAFF: If the patient is between 45yrs-49yrs, please advise patient to confirm benefits/coverage with their insurance company for a routine screening colonoscopy, some insurance carriers will only cover at 50yrs or older. If the patient is over 75years old, please schedule an office visit.     Does the patient want to see a Gastroenterologist prior to their procedure OR are they having any GI symptoms? no    Has the patient been hospitalized or had abdominal surgery in the past 6 months? no    Does the patient use supplemental oxygen? no    Does the patient take Coumadin, Lovenox, Plavix, Elliquis, Xarelto, or other blood thinning medication? no    Has the patient had a stroke, cardiac event, or stent placed in the past year? no    SCHEDULING STAFF: If patient answers NO to above questions, then schedule procedure. If patient answers YES to above questions, then schedule office appointment.     If patient is between 45yrs - 49yrs, please advise patient that we will have to confirm benefits & coverage with their insurance company for a routine screening colonoscopy.     Nephrology consult follow up note    HPI:      Franklin Macias is a 66 y.o. male admitted on 12/16/2023 with acute pancreatitis. He underwent emergent ERCP on 12/17 for choledocholithiasis. Patient did have some renal insufficiency on admission which has continued to worsen. He has been hydrated and now noted to have pulmonary vascular congestion per CXR done this morning. Patient is voiding well. He was evaluated by nephrology once many years ago for gross hematuria which has not been a recurring issue. No stones, UTI or NSAID use. He was given IVF, but developed pulmonary edema. Nephrology consulted for SHA.     Interval history:     No acute events overnight.  No new complaints.  Appetite remains poor.  He is drinking water, however, not eating.  No chest pain.  He is still requiring 5 L nasal cannula O2 for shortness of breath and hypoxia.    Objective:       VITAL SIGNS: 24 HR MIN & MAX LAST    Temp  Min: 98 °F (36.7 °C)  Max: 98.4 °F (36.9 °C)  98.3 °F (36.8 °C)        BP  Min: 131/82  Max: 156/83  (!) 156/83     Pulse  Min: 95  Max: 115  95     Resp  Min: 18  Max: 20  20    SpO2  Min: 93 %  Max: 96 %  95 %      GEN: Well appearing WM in NAD  HEENT: Conjunctiva anicteric, pupils equal   CV: RRR  without rub.  PULM: CTAB, unlabored, 5L NC O2  ABD: Soft, tender abdomen with hypoactive bowel sounds  EXT:  1+ dependent edema  SKIN: Warm and dry  PSYCH: Awake, alert and appropriately conversant.               Component Value Date/Time     12/21/2023 0443     12/20/2023 0645    K 3.2 (L) 12/21/2023 0443    K 3.3 (L) 12/20/2023 0645    CHLORIDE 97 (L) 12/21/2023 0443    CHLORIDE 100 12/20/2023 0645    CO2 27 12/21/2023 0443    CO2 26 12/20/2023 0645    BUN 44.6 (H) 12/21/2023 0443    BUN 44.9 (H) 12/20/2023 0645    CREATININE 1.32 (H) 12/21/2023 0443    CREATININE 1.41 (H) 12/20/2023 0645    CALCIUM 7.5 (L) 12/21/2023 0443    CALCIUM 7.3 (L) 12/20/2023 0645    PHOS 4.0 12/17/2023 0352            Component  Value Date/Time    WBC 18.42 (H) 12/21/2023 0443    WBC 18.34 (H) 12/20/2023 0645    WBC 24.97 12/16/2023 1710    HGB 13.6 (L) 12/21/2023 0443    HGB 13.9 (L) 12/20/2023 0645    HCT 39.2 (L) 12/21/2023 0443    HCT 42.4 12/20/2023 0645     12/21/2023 0443     12/20/2023 0645         Imaging reviewed      Assessment / Plan:     SHA s/t ATN s/t bacteremia and acute pancreatitis   Acute pancreatitis   Choledocholithiasis s/p emergent ERCP and sphincterotomy   Bacteremia   Pulmonary edema    Plan:  Renal function continues to improve.  Patient still appears hypervolemic on exam.  Received IV Lasix 20 mg this morning.  We will change to p.o. Lasix 40 mg daily, with 1st dose this evening.

## 2024-12-11 NOTE — TELEPHONE ENCOUNTER
Scheduled date of colonoscopy (as of today): 1/17/25  Physician performing colonoscopy: Dr. Encarnacion  Location of colonoscopy: [facility]  Bowel prep reviewed with patient: Clenpiq  Instructions reviewed with patient by: Kamla Schaeffer  Clearances: none    **patient was told to stop the Wegovy a week before.  Emailed instructions along with coupon    Patient did not tolerate the Miralax/Ducolax and would like clenpiq

## 2025-01-17 ENCOUNTER — ANESTHESIA (OUTPATIENT)
Dept: GASTROENTEROLOGY | Facility: AMBULATORY SURGERY CENTER | Age: 60
End: 2025-01-17

## 2025-01-17 ENCOUNTER — ANESTHESIA EVENT (OUTPATIENT)
Dept: GASTROENTEROLOGY | Facility: AMBULATORY SURGERY CENTER | Age: 60
End: 2025-01-17

## 2025-01-17 ENCOUNTER — HOSPITAL ENCOUNTER (OUTPATIENT)
Dept: GASTROENTEROLOGY | Facility: AMBULATORY SURGERY CENTER | Age: 60
Discharge: HOME | End: 2025-01-17
Attending: INTERNAL MEDICINE
Payer: COMMERCIAL

## 2025-01-17 VITALS
OXYGEN SATURATION: 98 % | TEMPERATURE: 98.3 F | RESPIRATION RATE: 16 BRPM | WEIGHT: 159 LBS | DIASTOLIC BLOOD PRESSURE: 78 MMHG | HEIGHT: 67 IN | BODY MASS INDEX: 24.96 KG/M2 | SYSTOLIC BLOOD PRESSURE: 120 MMHG | HEART RATE: 65 BPM

## 2025-01-17 DIAGNOSIS — Z86.0100 HISTORY OF COLON POLYPS: ICD-10-CM

## 2025-01-17 PROBLEM — I10 HTN (HYPERTENSION): Status: ACTIVE | Noted: 2025-01-17

## 2025-01-17 PROCEDURE — 45385 COLONOSCOPY W/LESION REMOVAL: CPT | Performed by: STUDENT IN AN ORGANIZED HEALTH CARE EDUCATION/TRAINING PROGRAM

## 2025-01-17 PROCEDURE — 88305 TISSUE EXAM BY PATHOLOGIST: CPT | Performed by: STUDENT IN AN ORGANIZED HEALTH CARE EDUCATION/TRAINING PROGRAM

## 2025-01-17 RX ORDER — SODIUM CHLORIDE, SODIUM LACTATE, POTASSIUM CHLORIDE, CALCIUM CHLORIDE 600; 310; 30; 20 MG/100ML; MG/100ML; MG/100ML; MG/100ML
50 INJECTION, SOLUTION INTRAVENOUS CONTINUOUS
Status: DISCONTINUED | OUTPATIENT
Start: 2025-01-17 | End: 2025-01-21 | Stop reason: HOSPADM

## 2025-01-17 RX ORDER — PROPOFOL 10 MG/ML
INJECTION, EMULSION INTRAVENOUS AS NEEDED
Status: DISCONTINUED | OUTPATIENT
Start: 2025-01-17 | End: 2025-01-17

## 2025-01-17 RX ADMIN — PROPOFOL 20 MG: 10 INJECTION, EMULSION INTRAVENOUS at 08:50

## 2025-01-17 RX ADMIN — PROPOFOL 20 MG: 10 INJECTION, EMULSION INTRAVENOUS at 08:55

## 2025-01-17 RX ADMIN — SODIUM CHLORIDE, SODIUM LACTATE, POTASSIUM CHLORIDE, CALCIUM CHLORIDE 50 ML/HR: 600; 310; 30; 20 INJECTION, SOLUTION INTRAVENOUS at 08:07

## 2025-01-17 RX ADMIN — PROPOFOL 20 MG: 10 INJECTION, EMULSION INTRAVENOUS at 09:00

## 2025-01-17 RX ADMIN — PROPOFOL 20 MG: 10 INJECTION, EMULSION INTRAVENOUS at 08:53

## 2025-01-17 RX ADMIN — PROPOFOL 20 MG: 10 INJECTION, EMULSION INTRAVENOUS at 08:58

## 2025-01-17 RX ADMIN — PROPOFOL 40 MG: 10 INJECTION, EMULSION INTRAVENOUS at 08:48

## 2025-01-17 RX ADMIN — PROPOFOL 80 MG: 10 INJECTION, EMULSION INTRAVENOUS at 08:37

## 2025-01-17 RX ADMIN — PROPOFOL 80 MG: 10 INJECTION, EMULSION INTRAVENOUS at 08:44

## 2025-01-17 NOTE — H&P
"History and Physical -  [facility]  Melissa Smith 59 y.o. female MRN: [id number]    HPI: Melissa Smith is a 59 y.o. female who presents for colonoscopy    REVIEW OF SYSTEMS: Per the HPI, and otherwise unremarkable.    Historical Information   Past Medical History:   Diagnosis Date    Hypertension      Past Surgical History:   Procedure Laterality Date    CERVICAL CONE BIOPSY       SECTION      COLONOSCOPY      FISTULA REPAIR       Social History   Social History     Substance and Sexual Activity   Alcohol Use Not Currently    Comment: social     Social History     Substance and Sexual Activity   Drug Use Never     Social History     Tobacco Use   Smoking Status Every Day    Current packs/day: 0.25    Types: Cigarettes   Smokeless Tobacco Never     No family history on file.    Meds/Allergies       Current Outpatient Medications:     losartan (COZAAR) 50 mg tablet    rosuvastatin (CRESTOR) 5 mg tablet    sodium picosulfate, magnesium oxide, citric acid (Clenpiq) oral solution    telmisartan-hydrochlorothiazide (MICARDIS HCT) 80-12.5 MG per tablet    Wegovy 0.5 MG/0.5ML    Current Facility-Administered Medications:     lactated ringers infusion, 50 mL/hr, Intravenous, Continuous, 50 mL/hr at 25 0807    No Known Allergies    Objective     /93   Pulse 81   Temp 98.3 °F (36.8 °C) (Temporal)   Resp 22   Ht 5' 7\" (1.702 m)   Wt 72.1 kg (159 lb)   SpO2 96%   BMI 24.90 kg/m²     PHYSICAL EXAM    General Appearance: NAD, cooperative, alert  Eyes: Anicteric  GI:  Soft, non-tender, non-distended; normal bowel sounds; no masses, no organomegaly   Rectal: Deferred until procedure  Musculoskeletal: No edema.  Skin:  No jaundice    ASSESSMENT/PLAN:  This is a 59 y.o. female here for colonoscopy, and she is stable and optimized for her procedure.        "

## 2025-01-17 NOTE — ANESTHESIA PREPROCEDURE EVALUATION
Procedure:  COLONOSCOPY    Relevant Problems   ANESTHESIA (within normal limits)      CARDIO   (+) HTN (hypertension)      ENDO (within normal limits)      GI/HEPATIC (within normal limits)      /RENAL (within normal limits)      GYN (within normal limits)      HEMATOLOGY (within normal limits)      MUSCULOSKELETAL (within normal limits)      NEURO/PSYCH (within normal limits)      PULMONARY (within normal limits)        Physical Exam    Airway    Mallampati score: I  TM Distance: >3 FB  Neck ROM: full     Dental   No notable dental hx     Cardiovascular      Pulmonary      Other Findings  post-pubertal.      Anesthesia Plan  ASA Score- 2     Anesthesia Type- IV sedation with anesthesia with ASA Monitors.         Additional Monitors:     Airway Plan:            Plan Factors-Exercise tolerance (METS): >4 METS.    Chart reviewed.    Patient summary reviewed.    Patient is a current smoker.              Induction- intravenous.    Postoperative Plan-         Informed Consent- Anesthetic plan and risks discussed with patient.  I personally reviewed this patient with the CRNA. Discussed and agreed on the Anesthesia Plan with the CRNA..      NPO Status:  Vitals Value Taken Time   Date of last liquid 01/17/25 01/17/25 0746   Time of last liquid 0300 01/17/25 0746   Date of last solid 01/15/25 01/17/25 0746   Time of last solid 1800 01/17/25 0746

## 2025-01-17 NOTE — ANESTHESIA POSTPROCEDURE EVALUATION
Post-Op Assessment Note    CV Status:  Stable         Mental Status:  Lethargic and sleepy   Hydration Status:  Stable   PONV Controlled:  None   Airway Patency:  Patent     Post Op Vitals Reviewed: Yes    No anethesia notable event occurred.    Staff: Anesthesiologist, CRNA   Comments: vss          Last Filed PACU Vitals:  Vitals Value Taken Time   Temp     Pulse 67    /71    Resp 12    SpO2 96

## 2025-01-22 ENCOUNTER — RESULTS FOLLOW-UP (OUTPATIENT)
Dept: GASTROENTEROLOGY | Facility: CLINIC | Age: 60
End: 2025-01-22

## 2025-01-22 PROCEDURE — 88305 TISSUE EXAM BY PATHOLOGIST: CPT | Performed by: STUDENT IN AN ORGANIZED HEALTH CARE EDUCATION/TRAINING PROGRAM
